# Patient Record
Sex: FEMALE | Race: BLACK OR AFRICAN AMERICAN | NOT HISPANIC OR LATINO | Employment: FULL TIME | URBAN - METROPOLITAN AREA
[De-identification: names, ages, dates, MRNs, and addresses within clinical notes are randomized per-mention and may not be internally consistent; named-entity substitution may affect disease eponyms.]

---

## 2019-08-15 LAB
HPV 16: NEGATIVE
HPV 18: NEGATIVE
HPV OTHER HR TYPES: NEGATIVE

## 2020-02-24 PROBLEM — N93.8 DYSFUNCTIONAL UTERINE BLEEDING: Status: ACTIVE | Noted: 2019-08-12

## 2020-02-24 PROBLEM — Z30.42 ENCOUNTER FOR DEPO-PROVERA CONTRACEPTION: Status: ACTIVE | Noted: 2019-11-25

## 2020-02-24 PROBLEM — D25.9 FIBROID UTERUS: Status: ACTIVE | Noted: 2019-08-12

## 2020-07-20 PROBLEM — K80.20 CALCULUS OF GALLBLADDER WITHOUT CHOLECYSTITIS WITHOUT OBSTRUCTION: Status: ACTIVE | Noted: 2020-07-20

## 2020-08-06 PROBLEM — K80.20 CHOLELITHIASIS: Status: ACTIVE | Noted: 2020-08-06

## 2020-08-16 ENCOUNTER — NURSE TRIAGE (OUTPATIENT)
Dept: ADMINISTRATIVE | Facility: CLINIC | Age: 40
End: 2020-08-16

## 2020-08-16 NOTE — TELEPHONE ENCOUNTER
Post-operative day 10  Pt verified identity by spelling first and last names and verifying . Pt is enrolled in Symptom Tracker. Pt doesn't have a thermometer or pulse oximeter. COVID-19 assessment completed and with all collected data within WNLs. Pt states that she answered 'Yes' to the Symptom Tracker this AM because she wanted to speak with nurse about persistent 8/10 pain that she describes as underneath her right breast region and intensifies upon deep inhalation with incentive spirometry and states that this started 2 days ago. Pt admits that she has an intermittent cough with production of scant green mucus that developed post-operatively. Pt is familiar with gas pains and states that it is similar and that she's passing flatus, but the pain is unrelenting. Pt states that she wasn't prescribed narcotics after surgery and was only taking Tylenol. Pt states that she is suffering from constipation and that it is an ordeal to have a bowel movement and that stool softeners are ineffective. Advised pt that she increase fiber, water and roughage intake; pt voiced verbal understanding and agrees with advice. Care advice provided and pt informed that OOC RN will contact surgeon and that someone would reach out to her and if she doesn't hear from someone in the event of technology issues, she should leave and go to the nearest ED for evaluation; pt voiced verbal understanding and agrees with advice. Instructed pt to call [facility] back for further assistance if needed or if symptoms worsened and call 911 or all emergenices; pt voiced verbal understanding and agrees with goals.      Reason for Disposition   [1] SEVERE post-op pain (e.g., excruciating, pain scale 8-10) AND [2] not controlled with pain medications    Additional Information   Negative: Sounds like a life-threatening emergency to the triager   Negative: Chest pain   Negative: Difficulty breathing   Negative: Surgical incision symptoms and questions    Negative: [1] Discomfort (pain, burning or stinging) when passing urine AND [2] male   Negative: [1] Discomfort (pain, burning or stinging) when passing urine AND [2] female   Negative: Constipation   Negative: Calf pain   Negative: Dizziness is severe, or persists > 24 hours after surgery   Negative: Pain, redness, swelling, or pus at IV Site   Negative: Symptoms arising from use of a urinary catheter (Frazier or Coude)   Negative: Cast problems or questions   Negative: Medication question   Negative: [1] Widespread rash AND [2] bright red, sunburn-like   Negative: [1] SEVERE headache AND [2] after spinal (epidural) anesthesia   Negative: [1] Vomiting AND [2] persists > 4 hours   Negative: [1] Vomiting AND [2] abdomen looks much more swollen than usual   Negative: [1] Drinking very little AND [2] dehydration suspected (e.g., no urine > 12 hours, very dry mouth, very lightheaded)   Negative: Patient sounds very sick or weak to the triager   Negative: Sounds like a serious complication to the triager   Negative: Fever > 100.5 F (38.1 C)    Protocols used: ST POST-OP SYMPTOMS AND [id number]

## 2020-08-16 NOTE — TELEPHONE ENCOUNTER
Called [facility]  and she states that Charlene Benton isn't in the system. Also attempted to secure chat MD, but provider isn't in the secure chat system. Will call pt back and inform her to go to the nearest ED for evaluation.

## 2021-02-21 PROBLEM — I24.9 ACS (ACUTE CORONARY SYNDROME): Status: ACTIVE | Noted: 2021-02-21

## 2021-02-21 PROBLEM — R07.89 CHEST TIGHTNESS: Status: ACTIVE | Noted: 2021-02-21

## 2021-06-26 ENCOUNTER — PATIENT OUTREACH (OUTPATIENT)
Dept: ADMINISTRATIVE | Facility: HOSPITAL | Age: 41
End: 2021-06-26

## 2021-07-13 ENCOUNTER — PATIENT OUTREACH (OUTPATIENT)
Dept: ADMINISTRATIVE | Facility: HOSPITAL | Age: 41
End: 2021-07-13

## 2022-04-27 PROBLEM — Z90.49 HX LAPAROSCOPIC CHOLECYSTECTOMY: Status: ACTIVE | Noted: 2022-04-27

## 2022-04-27 PROBLEM — I24.9 ACS (ACUTE CORONARY SYNDROME): Status: RESOLVED | Noted: 2021-02-21 | Resolved: 2022-04-27

## 2024-09-27 DIAGNOSIS — Z12.31 OTHER SCREENING MAMMOGRAM: ICD-10-CM
